# Patient Record
Sex: MALE | Race: WHITE | NOT HISPANIC OR LATINO | Employment: FULL TIME | URBAN - METROPOLITAN AREA
[De-identification: names, ages, dates, MRNs, and addresses within clinical notes are randomized per-mention and may not be internally consistent; named-entity substitution may affect disease eponyms.]

---

## 2018-09-15 ENCOUNTER — APPOINTMENT (OUTPATIENT)
Dept: GENERAL RADIOLOGY | Facility: HOSPITAL | Age: 28
End: 2018-09-15

## 2018-09-15 ENCOUNTER — HOSPITAL ENCOUNTER (EMERGENCY)
Facility: HOSPITAL | Age: 28
Discharge: HOME | End: 2018-09-15
Attending: EMERGENCY MEDICINE | Admitting: EMERGENCY MEDICINE

## 2018-09-15 VITALS
RESPIRATION RATE: 18 BRPM | BODY MASS INDEX: 22.35 KG/M2 | SYSTOLIC BLOOD PRESSURE: 138 MMHG | TEMPERATURE: 98.4 F | OXYGEN SATURATION: 97 % | DIASTOLIC BLOOD PRESSURE: 72 MMHG | WEIGHT: 165 LBS | HEART RATE: 100 BPM | HEIGHT: 72 IN

## 2018-09-15 DIAGNOSIS — S90.32XA CONTUSION OF LEFT FOOT, INITIAL ENCOUNTER: Primary | ICD-10-CM

## 2018-09-15 PROCEDURE — 73630 X-RAY EXAM OF FOOT: CPT

## 2018-09-15 PROCEDURE — 99283 EMERGENCY DEPT VISIT LOW MDM: CPT

## 2018-09-15 RX ORDER — TRAMADOL HYDROCHLORIDE 50 MG/1
50 TABLET ORAL EVERY 6 HOURS PRN
Qty: 8 TABLET | Refills: 0 | Status: SHIPPED | OUTPATIENT
Start: 2018-09-15

## 2018-09-15 NOTE — ED PROVIDER NOTES
EMERGENCY DEPARTMENT ENCOUNTER    CHIEF COMPLAINT  Chief Complaint: left foot pain  History given by:drew  History limited by:nothing  Time Seen: 0950  Room Number: 05/05  PMD: Provider, No Known      HPI:  Pt is a 28 y.o. male who presents with left foot pain.  Patient states that he was out dancing last night and then woke up this morning with pain and swelling.  Patient does not remember exactly how he injured his foot.  Patient with a history of left ankle fracture with pins in place.  Patient denies any ankle pain.  Patient denies any numbness or tingling.    Duration: last night  Location:left foot  Radiation:denies  Quality:pain  Intensity/Severity:moderate  Progression:persistent  Associated Symptoms:swelling  Aggravating Factors:walking  Alleviating Factors:rest  Previous Episodes:left ankle injure in past with pins  Treatment before arrival:2 aleve    PAST MEDICAL HISTORY  Active Ambulatory Problems     Diagnosis Date Noted   • No Active Ambulatory Problems     Resolved Ambulatory Problems     Diagnosis Date Noted   • No Resolved Ambulatory Problems     No Additional Past Medical History       PAST SURGICAL HISTORY  Past Surgical History:   Procedure Laterality Date   • ANKLE SURGERY         FAMILY HISTORY  History reviewed. No pertinent family history.    SOCIAL HISTORY  Social History     Social History   • Marital status: Single     Spouse name: N/A   • Number of children: N/A   • Years of education: N/A     Occupational History   • Not on file.     Social History Main Topics   • Smoking status: Current Every Day Smoker   • Smokeless tobacco: Not on file   • Alcohol use Yes   • Drug use: Yes     Types: Marijuana   • Sexual activity: Not on file     Other Topics Concern   • Not on file     Social History Narrative   • No narrative on file         ALLERGIES  Patient has no known allergies.    REVIEW OF SYSTEMS  Review of Systems   Constitutional: Negative for chills and fever.   Musculoskeletal:  Positive for arthralgias, gait problem and joint swelling.   Skin: Negative for rash and wound.   Psychiatric/Behavioral: The patient is not nervous/anxious.        PHYSICAL EXAM  ED Triage Vitals [09/15/18 0948]   Temp Heart Rate Resp BP SpO2   98.4 °F (36.9 °C) (!) 122 18 -- 97 %      Temp src Heart Rate Source Patient Position BP Location FiO2 (%)   Tympanic Monitor -- -- --       Physical Exam   Constitutional: He is well-developed, well-nourished, and in no distress. No distress.   HENT:   Head: Normocephalic.   Cardiovascular:   Pulses:       Dorsalis pedis pulses are 2+ on the left side.        Posterior tibial pulses are 2+ on the left side.   Musculoskeletal:        Left ankle: Normal. Achilles tendon normal.        Left foot: There is decreased range of motion, tenderness and swelling. There is normal capillary refill.        Feet:    Skin: Skin is warm and dry. No rash noted.   Psychiatric: Mood, memory, affect and judgment normal.   Nursing note and vitals reviewed.      RADIOLOGY  XR Foot 3+ View Left   Final Result       No acute fracture is identified. If there is clinical suspicion for   radiographically occult fracture, or to further growth soft tissues, MRI   could be considered.       This report was finalized on 9/15/2018 10:40 AM by Dr. Tawanda Bowens M.D.              I ordered the above noted radiological studies and reviewed the images on the PACS system.      PROGRESS AND CONSULTS    Reviewed pt's history and workup with Dr. Pelayo.   After a bedside evaluation; Dr Pelayo agrees with the plan of care    Discussed x-ray findings and plan of care with patient.  Ace wrap and crutches will be administered prior to discharge.  Patient verbalizes understanding and agrees with plan.  All questions answered prior to discharge    COURSE & MEDICAL DECISION MAKING  Pertinent  Imaging studies that were ordered and reviewed are noted above.  Results were reviewed/discussed with the patient and  "they were also made aware of online assess.     MEDICATIONS GIVEN IN ER  Medications - No data to display    /72   Pulse 100   Temp 98.4 °F (36.9 °C) (Tympanic)   Resp 18   Ht 182.9 cm (72\")   Wt 74.8 kg (165 lb)   SpO2 97%   BMI 22.38 kg/m²     Discussed all results and noted any abnormalities with patient.  Discussed absoute need to recheck abnormalities with PMD    Reviewed implications of results, diagnosis, meds, responsibility to follow up, warning signs and symptoms of possible worsening, potential complications and reasons to return to ER with patient    Discussed plan for discharge, as there is no emergent indication for admission.  Pt is agreeable and understands need for follow up and repeat testing.  Pt is aware that discharge does not mean that nothing is wrong but it indicates no emergency is present and they must continue care with PMD.  Pt is discharged with instructions to follow up with primary care doctor to have their blood pressure rechecked.       DIAGNOSIS  Final diagnoses:   Contusion of left foot, initial encounter       FOLLOW UP   PATIENT LIAISON [place] [id number] [phone]  Schedule an appointment as soon as possible for a visit         RX     Medication List      New Prescriptions    traMADol 50 MG tablet  Commonly known as:  ULTRAM  Take 1 tablet by mouth Every 6 (Six) Hours As Needed for Moderate Pain .          Tomi report [id number] reviewed.  Risks, benefits, alternatives discussed with patient.  Pt consents to treatment and agrees to follow up with PMD tomorrow for further care and any other prescriptions.     I personally reviewed the past medical history, past surgical history, social history, family history, current medications and allergies as they appear in this chart.  The scribe's note accurately reflects the work and decisions made by me.        Debra Maldonado, APRN  09/15/18 1050    "

## 2018-09-15 NOTE — ED PROVIDER NOTES
Pt states that he was dancing last night and rolled his left foot. Today, he noticed that he had left foot pain and swelling. He denies left ankle pain, sensory loss, and sustaining any other injury.      Limited physical exam: tenderness and swelling over the lateral left midfoot, normal left pedal pulses, normal sensation in left toes    Plan is to check left foot xray for further evaluation.       MD ATTESTATION NOTE    The NORMA and I have discussed this patient's history, physical exam, and treatment plan.  I have reviewed the documentation and personally had a face to face interaction with the patient. I affirm the documentation and agree with the treatment and plan.  The attached note describes my personal findings.      Documentation assistance provided by laura Villarreal for Dr Pelayo. Information recorded by the scribe was done at my direction and has been verified and validated by me.      Kimberlee Villarreal  09/15/18 2800       Adis Pelayo MD  09/15/18 0038

## 2018-09-15 NOTE — DISCHARGE INSTRUCTIONS
Pt instructions:  Rest, ice, elevation  Follow up with Primary Care Doctor for further management and to have your blood pressure rechecked.   Return to ER with pain, swelling, numbness/tingling, fever, chills, weakness, nausea, vomiting, diarrhea, abdominal pain, back pain, urinary concerns, chest pain, shortness of breath, dizziness, headache, worsening of symptoms or other concerns.